# Patient Record
Sex: MALE | Race: BLACK OR AFRICAN AMERICAN | Employment: OTHER | URBAN - METROPOLITAN AREA
[De-identification: names, ages, dates, MRNs, and addresses within clinical notes are randomized per-mention and may not be internally consistent; named-entity substitution may affect disease eponyms.]

---

## 2019-05-05 ENCOUNTER — APPOINTMENT (OUTPATIENT)
Dept: GENERAL RADIOLOGY | Age: 64
End: 2019-05-05
Payer: COMMERCIAL

## 2019-05-05 ENCOUNTER — HOSPITAL ENCOUNTER (EMERGENCY)
Age: 64
Discharge: HOME | End: 2019-05-05
Attending: EMERGENCY MEDICINE
Payer: COMMERCIAL

## 2019-05-05 ENCOUNTER — APPOINTMENT (OUTPATIENT)
Dept: CT IMAGING | Age: 64
End: 2019-05-05
Payer: COMMERCIAL

## 2019-05-05 VITALS
OXYGEN SATURATION: 96 % | RESPIRATION RATE: 16 BRPM | SYSTOLIC BLOOD PRESSURE: 145 MMHG | DIASTOLIC BLOOD PRESSURE: 113 MMHG | HEART RATE: 105 BPM | TEMPERATURE: 97.8 F

## 2019-05-05 DIAGNOSIS — M54.31 BILATERAL SCIATICA: Primary | ICD-10-CM

## 2019-05-05 DIAGNOSIS — M54.32 BILATERAL SCIATICA: Primary | ICD-10-CM

## 2019-05-05 PROCEDURE — 6370000000 HC RX 637 (ALT 250 FOR IP): Performed by: EMERGENCY MEDICINE

## 2019-05-05 PROCEDURE — 72072 X-RAY EXAM THORAC SPINE 3VWS: CPT

## 2019-05-05 PROCEDURE — 99284 EMERGENCY DEPT VISIT MOD MDM: CPT

## 2019-05-05 PROCEDURE — 72131 CT LUMBAR SPINE W/O DYE: CPT

## 2019-05-05 PROCEDURE — 72100 X-RAY EXAM L-S SPINE 2/3 VWS: CPT

## 2019-05-05 PROCEDURE — 96374 THER/PROPH/DIAG INJ IV PUSH: CPT

## 2019-05-05 PROCEDURE — 6360000002 HC RX W HCPCS: Performed by: EMERGENCY MEDICINE

## 2019-05-05 RX ORDER — CYCLOBENZAPRINE HCL 10 MG
10 TABLET ORAL ONCE
Status: COMPLETED | OUTPATIENT
Start: 2019-05-05 | End: 2019-05-05

## 2019-05-05 RX ORDER — LIDOCAINE 4 G/G
1 PATCH TOPICAL DAILY
Status: DISCONTINUED | OUTPATIENT
Start: 2019-05-05 | End: 2019-05-05 | Stop reason: HOSPADM

## 2019-05-05 RX ORDER — LOSARTAN POTASSIUM 50 MG/1
50 TABLET ORAL
COMMUNITY
Start: 2012-11-08

## 2019-05-05 RX ORDER — KETOROLAC TROMETHAMINE 30 MG/ML
15 INJECTION, SOLUTION INTRAMUSCULAR; INTRAVENOUS ONCE
Status: COMPLETED | OUTPATIENT
Start: 2019-05-05 | End: 2019-05-05

## 2019-05-05 RX ORDER — CYCLOBENZAPRINE HCL 5 MG
5 TABLET ORAL 2 TIMES DAILY PRN
Qty: 30 TABLET | Refills: 0 | Status: SHIPPED | OUTPATIENT
Start: 2019-05-05 | End: 2019-05-15

## 2019-05-05 RX ORDER — ACETAMINOPHEN 500 MG
1000 TABLET ORAL 3 TIMES DAILY
Qty: 30 TABLET | Refills: 0 | Status: SHIPPED | OUTPATIENT
Start: 2019-05-05

## 2019-05-05 RX ADMIN — CYCLOBENZAPRINE HYDROCHLORIDE 10 MG: 10 TABLET, FILM COATED ORAL at 17:42

## 2019-05-05 RX ADMIN — KETOROLAC TROMETHAMINE 15 MG: 30 INJECTION, SOLUTION INTRAMUSCULAR at 17:15

## 2019-05-05 ASSESSMENT — PAIN SCALES - GENERAL
PAINLEVEL_OUTOF10: 2
PAINLEVEL_OUTOF10: 5
PAINLEVEL_OUTOF10: 8

## 2019-05-05 ASSESSMENT — PAIN DESCRIPTION - PAIN TYPE: TYPE: CHRONIC PAIN;ACUTE PAIN

## 2019-05-05 ASSESSMENT — PAIN DESCRIPTION - LOCATION: LOCATION: BACK

## 2019-05-05 ASSESSMENT — PAIN DESCRIPTION - DESCRIPTORS: DESCRIPTORS: SHOOTING

## 2019-05-05 ASSESSMENT — PAIN DESCRIPTION - ORIENTATION: ORIENTATION: RIGHT;LEFT

## 2019-05-05 NOTE — ED NOTES
Patient fell backwards when he dropped an air conditioner. Patient complaining of back pain that radiates down both legs.       Shawna Payne RN  05/05/19 0584

## 2019-05-06 NOTE — ED NOTES
Patient discharged to home with family. Patient verbalized understanding of discharge instructions. Advised of when to return to ED and when to call 911. Advised of follow up with PCP. Patient received 2 Rx's with education. Patient verbalized understanding of education. No questions from patient to this RN. Patient left ED without incident.       Lori Lamb RN  05/05/19 2022

## 2022-06-01 NOTE — ED NOTES
[address] [facility] RESIDENT NOTE     Date of evaluation: 5/5/2019    ADDENDUM:      Care of this patient was assumed from Dr Lenka Butterfield. The patient was seen for Fall and Back Pain  . The patient's initial evaluation and plan have been discussed with the prior provider who initially evaluated the patient. Nursing Notes, Past Medical Hx, Past Surgical Hx, Social Hx, Allergies, and Family Hx were all reviewed. Diagnostic Results       RADIOLOGY:  CT LUMBAR SPINE WO CONTRAST   Final Result      1. L4-5 grade 1 spondylolisthesis of 6 to 7 m with severe facet hypertrophy contributing to severe concentric central and bilateral foraminal stenosis as described above. 2. Mild facet hypertrophy at several other levels but no acute fracture identified. No scoliosis   3. Some lytic changes noted in the right ilium, correlate clinically      XR LUMBAR SPINE (2-3 VIEWS)   Final Result      No sign of any acute fracture, however there is grade 1 spondylolisthesis of L4 in relation L5 and moderate disc space loss, likely secondary to moderate to severe facet hypertrophy at L4-5 and L5-S1 levels. The listhesis is approximately 6 to 7 mm      THORACIC SPINE 2 views on 5/5/2019      HISTORY: Mid back pain after fall      COMPARISON: None. FINDINGS:      The vertebral bodies and disc spaces appear within normal limits. The alignment is normal. Mild spondylosis from T6 through T12      IMPRESSION:      No sign of any acute fracture deformity. There is normal alignment. Mild disc space loss and spondylosis are appreciated from T6 through the T12 level            XR THORACIC SPINE (3 VIEWS)   Final Result      No sign of any acute fracture, however there is grade 1 spondylolisthesis of L4 in relation L5 and moderate disc space loss, likely secondary to moderate to severe facet hypertrophy at L4-5 and L5-S1 levels.  The listhesis is approximately 6 to 7 mm      THORACIC SPINE 2 Follows with Dr Maryanne Arnold outpatient  Receiving chemo/immunotherapy, last infusion 5/16, next scheduled infusion 6/1  Resume home Protonix, dronabinol, Zofran   Follow up outpatient with oncology, palliative care views on 5/5/2019      HISTORY: Mid back pain after fall      COMPARISON: None. FINDINGS:      The vertebral bodies and disc spaces appear within normal limits. The alignment is normal. Mild spondylosis from T6 through T12      IMPRESSION:      No sign of any acute fracture deformity. There is normal alignment. Mild disc space loss and spondylosis are appreciated from T6 through the T12 level                LABS:   No results found for this visit on 05/05/19. RECENT VITALS:  BP: (!) 145/113, Temp: 97.8 °F (36.6 °C), Pulse: 105, Resp: 16, SpO2: 96 %     Procedures       ED Course     The patient was given the following medications:  Orders Placed This Encounter   Medications    ketorolac (TORADOL) injection 15 mg    cyclobenzaprine (FLEXERIL) tablet 10 mg    lidocaine 4 % external patch 1 patch    cyclobenzaprine (FLEXERIL) 5 MG tablet     Sig: Take 1 tablet by mouth 2 times daily as needed for Muscle spasms     Dispense:  30 tablet     Refill:  0    acetaminophen (TYLENOL) 500 MG tablet     Sig: Take 2 tablets by mouth 3 times daily     Dispense:  30 tablet     Refill:  0       CONSULTS:  None    MEDICAL DECISION MAKING / ASSESSMENT / Dirk Huey is a 58 y.o. male with history of sciatica presenting with back pain. Patient has a history of left-sided sciatica but fell last night and now has some new pain radiating down the right leg. Denies numbness or tingling. Pain is actually improved with ambulation. Denies bowel or bladder incontinence or retention. He is neurovascularly intact in the lower extremities. He has a positive straight leg raise on the left. At the time of sign out CT imaging was pending. CT remarkable for moderate central and foraminal stenosis due to anterior listhesis at L4-L5. He is neurovascularly intact. Do not think he warrants inpatient treatment at this time. We have referred him to spine surgery or he will pursue his own via the [place] system.   He